# Patient Record
Sex: MALE | Race: BLACK OR AFRICAN AMERICAN | NOT HISPANIC OR LATINO | Employment: FULL TIME | URBAN - NONMETROPOLITAN AREA
[De-identification: names, ages, dates, MRNs, and addresses within clinical notes are randomized per-mention and may not be internally consistent; named-entity substitution may affect disease eponyms.]

---

## 2021-10-07 ENCOUNTER — OFFICE VISIT (OUTPATIENT)
Dept: FAMILY MEDICINE | Facility: CLINIC | Age: 36
End: 2021-10-07

## 2021-10-07 VITALS
RESPIRATION RATE: 20 BRPM | DIASTOLIC BLOOD PRESSURE: 86 MMHG | HEIGHT: 73 IN | WEIGHT: 199.81 LBS | TEMPERATURE: 97 F | OXYGEN SATURATION: 99 % | HEART RATE: 75 BPM | SYSTOLIC BLOOD PRESSURE: 130 MMHG | BODY MASS INDEX: 26.48 KG/M2

## 2021-10-07 DIAGNOSIS — L20.82 FLEXURAL ECZEMA: ICD-10-CM

## 2021-10-07 DIAGNOSIS — M72.2 PLANTAR FASCIITIS OF RIGHT FOOT: ICD-10-CM

## 2021-10-07 DIAGNOSIS — M79.671 FOOT PAIN, RIGHT: Primary | ICD-10-CM

## 2021-10-07 PROCEDURE — 99214 PR OFFICE/OUTPT VISIT, EST, LEVL IV, 30-39 MIN: ICD-10-PCS | Mod: ,,, | Performed by: FAMILY MEDICINE

## 2021-10-07 PROCEDURE — 99214 OFFICE O/P EST MOD 30 MIN: CPT | Mod: ,,, | Performed by: FAMILY MEDICINE

## 2021-10-07 RX ORDER — MOMETASONE FUROATE 1 MG/G
CREAM TOPICAL 2 TIMES DAILY
Qty: 120 G | Refills: 5 | Status: SHIPPED | OUTPATIENT
Start: 2021-10-07

## 2021-10-07 RX ORDER — DICLOFENAC SODIUM 50 MG/1
50 TABLET, DELAYED RELEASE ORAL 2 TIMES DAILY
Qty: 30 TABLET | Refills: 2 | Status: SHIPPED | OUTPATIENT
Start: 2021-10-07

## 2022-02-04 ENCOUNTER — CLINICAL SUPPORT (OUTPATIENT)
Dept: PRIMARY CARE CLINIC | Facility: CLINIC | Age: 37
End: 2022-02-04

## 2022-02-04 DIAGNOSIS — Z02.4 ENCOUNTER FOR DEPARTMENT OF TRANSPORTATION (DOT) EXAMINATION FOR DRIVING LICENSE RENEWAL: ICD-10-CM

## 2022-02-04 PROCEDURE — 99499 UNLISTED E&M SERVICE: CPT | Mod: ,,, | Performed by: NURSE PRACTITIONER

## 2022-02-04 PROCEDURE — 99499 PR PHYSICAL - DOT/CDL: ICD-10-PCS | Mod: ,,, | Performed by: NURSE PRACTITIONER

## 2022-02-07 NOTE — PROGRESS NOTES
Subjective:       Patient ID: Deann Blanca is a 36 y.o. male.    Chief Complaint: No chief complaint on file.    HPI  Review of Systems      Objective:      Physical Exam    Assessment:       Problem List Items Addressed This Visit    None     Visit Diagnoses     Encounter for Department of Transportation (DOT) examination for driving license renewal              Plan:       See scanned documents in .

## 2023-01-06 ENCOUNTER — OFFICE VISIT (OUTPATIENT)
Dept: FAMILY MEDICINE | Facility: CLINIC | Age: 38
End: 2023-01-06

## 2023-01-06 ENCOUNTER — HOSPITAL ENCOUNTER (OUTPATIENT)
Dept: RADIOLOGY | Facility: HOSPITAL | Age: 38
Discharge: HOME | End: 2023-01-06
Attending: FAMILY MEDICINE

## 2023-01-06 VITALS
RESPIRATION RATE: 16 BRPM | WEIGHT: 203 LBS | OXYGEN SATURATION: 99 % | HEART RATE: 66 BPM | SYSTOLIC BLOOD PRESSURE: 162 MMHG | TEMPERATURE: 98 F | DIASTOLIC BLOOD PRESSURE: 90 MMHG | BODY MASS INDEX: 26.9 KG/M2 | HEIGHT: 73 IN

## 2023-01-06 DIAGNOSIS — M79.672 LEFT FOOT PAIN: Primary | ICD-10-CM

## 2023-01-06 DIAGNOSIS — M79.672 LEFT FOOT PAIN: ICD-10-CM

## 2023-01-06 DIAGNOSIS — S93.602A SPRAIN OF LEFT FOOT, INITIAL ENCOUNTER: ICD-10-CM

## 2023-01-06 PROBLEM — S93.505A: Status: ACTIVE | Noted: 2023-01-06

## 2023-01-06 PROCEDURE — 73630 X-RAY EXAM OF FOOT: CPT | Mod: TC,LT

## 2023-01-06 PROCEDURE — 99214 PR OFFICE/OUTPT VISIT, EST, LEVL IV, 30-39 MIN: ICD-10-PCS | Mod: ,,, | Performed by: FAMILY MEDICINE

## 2023-01-06 PROCEDURE — 99214 OFFICE O/P EST MOD 30 MIN: CPT | Mod: ,,, | Performed by: FAMILY MEDICINE

## 2023-01-06 RX ORDER — DICLOFENAC SODIUM 50 MG/1
50 TABLET, DELAYED RELEASE ORAL 2 TIMES DAILY
Qty: 30 TABLET | Refills: 1 | Status: SHIPPED | OUTPATIENT
Start: 2023-01-06

## 2023-01-06 NOTE — ASSESSMENT & PLAN NOTE
Sprain of his foot with the pain at the calcaneus on the plantar aspect of the foot.  Will put him on a walking boot to help with immobilizing midfoot.  Will put him on diclofenac 50 mg twice daily.  Follow-up with us in 2 weeks.  He is not improve will consider a CT scan of his midfoot and an orthopedic consultation.

## 2023-01-06 NOTE — PROGRESS NOTES
01/06/23 0848   Depression Patient Health Questionnaire (PHQ-2)   Over the last two weeks how often have you been bothered by little interest or pleasure in doing things 0   Over the last two weeks how often have you been bothered by feeling down, depressed or hopeless 0   PHQ-2 Total Score 0   Depression Patient Health Questionnaire (PHQ-9)   Over the last two weeks how often have you been bothered by trouble falling or staying asleep, or sleeping too much 0   Over the last two weeks how often have you been bothered by feeling tired or having little energy 1   Over the last two weeks how often have you been bothered by a poor appetite or overeating 0   Over the last two weeks how often have you been bothered by feeling bad about yourself - or that you are a failure or have let yourself or your family down 0   Over the last two weeks how often have you been bothered by trouble concentrating on things, such as reading the newspaper or watching television 0   Over the last two weeks how often have you been bothered by moving or speaking so slowly that other people could have noticed. Or the opposite - being so fidgety or restless that you have been moving around a lot more than usual. 0   Over the last two weeks how often have you been bothered by thoughts that you would be better off dead, or of hurting yourself 0   If you checked off any problems, how difficult have these problems made it for you to do your work, take care of things at home or get along with other people? Not difficult at all   PHQ-9 Score 1   PHQ-9 Interpretation Minimal or None

## 2023-01-06 NOTE — PROGRESS NOTES
Is   Bo Newby DO   [address] [place]  [address]      PATIENT NAME: Deann Blanca  : 1985  DATE: 23  MRN: [id number]      Billing Provider: Bo Newby DO  Level of Service:   Patient PCP Information       Provider PCP Type    Bo Newby DO General            Reason for Visit / Chief Complaint: Heel Pain (Left heel. Was playing basketball a couple months ago and came down on his heel and has been hurting on and off since then. Sometimes he has throbbing pain and sometimes he can't put a lot of pressure on it. ) and Toe Pain (Reports left great toe has pain in it sometimes when he is walking. Reports it started about a month ago. )       Update PCP  Update Chief Complaint         History of Present Illness / Problem Focused Workflow     Deann Blanca presents to the clinic with Heel Pain (Left heel. Was playing basketball a couple months ago and came down on his heel and has been hurting on and off since then. Sometimes he has throbbing pain and sometimes he can't put a lot of pressure on it. ) and Toe Pain (Reports left great toe has pain in it sometimes when he is walking. Reports it started about a month ago. )     Patient reports left heel pain after he jumped on a proximally a month ago.  He states he is had pain off and on in the heel since then.  Also has some pain in his mid foot.  He denies any lower leg pain.  No prior history of injury to that area.  No numbness or tingling has been noted.    Toe Pain   Pertinent negatives include no numbness.     Review of Systems     Review of Systems   Constitutional:  Negative for activity change, appetite change, chills, fatigue and fever.   HENT:  Negative for nasal congestion, ear discharge, ear pain, mouth dryness, mouth sores, postnasal drip, sinus pressure/congestion, sore throat and voice change.    Eyes:  Negative for pain, discharge, redness, itching and visual disturbance.   Respiratory:   Negative for apnea, cough, chest tightness, shortness of breath and wheezing.    Cardiovascular:  Negative for chest pain, palpitations and leg swelling.   Gastrointestinal:  Negative for abdominal distention, abdominal pain, anal bleeding, blood in stool, change in bowel habit, constipation, diarrhea, nausea, vomiting, reflux and change in bowel habit.   Endocrine: Negative for cold intolerance, heat intolerance, polydipsia, polyphagia and polyuria.   Genitourinary:  Negative for difficulty urinating, enuresis, erectile dysfunction, frequency, genital sores, hematuria and urgency.   Musculoskeletal:  Positive for joint swelling, leg pain and joint deformity. Negative for arthralgias, back pain, gait problem, myalgias and neck pain.   Integumentary:  Negative for rash, mole/lesion, breast mass and breast discharge.   Allergic/Immunologic: Negative for environmental allergies and food allergies.   Neurological:  Negative for dizziness, vertigo, tremors, seizures, syncope, facial asymmetry, speech difficulty, weakness, light-headedness, numbness, headaches, coordination difficulties, memory loss and coordination difficulties.   Hematological:  Negative for adenopathy. Does not bruise/bleed easily.   Psychiatric/Behavioral:  Negative for agitation, behavioral problems, confusion, decreased concentration, dysphoric mood, hallucinations, self-injury, sleep disturbance and suicidal ideas. The patient is not nervous/anxious and is not hyperactive.    Breast: Negative for mass    Medical / Social / Family History     Past Medical History:   Diagnosis Date    Allergic contact dermatitis due to other agents 11/14/2019       History reviewed. No pertinent surgical history.    Social History    reports that he has never smoked. He has been exposed to tobacco smoke. He has never used smokeless tobacco. He reports current alcohol use. He reports that he does not use drugs.    Family History  's family history includes  Diabetes in his brother; Heart attacks under age 50 in his brother; Hypertension in his paternal grandmother; No Known Problems in his daughter, father, maternal grandfather, maternal grandmother, mother, sister, and son.    Medications and Allergies     Medications  No outpatient medications have been marked as taking for the 1/6/23 encounter (Office Visit) with Bo Newby DO.       Allergies  Review of patient's allergies indicates:  No Known Allergies    Physical Examination     Vitals:    01/06/23 0837   BP: (!) 162/90   Pulse: 66   Resp: 16   Temp: 97.7 °F (36.5 °C)     Physical Exam  Constitutional:       Appearance: Normal appearance. He is normal weight.   Musculoskeletal:         General: Tenderness and signs of injury present. No swelling or deformity. Normal range of motion.      Right lower leg: No edema.      Left lower leg: No edema.      Comments: Midfoot mildly tender but no deformity is noted.  Range of motion of the toes the ankle or full.  There is some mild tenderness in the calcaneus on the plantar aspect of the foot.  The talus and the ankle mortise are intact and nontender.  Range of motion is full.  Neurovascular is intact.   Skin:     General: Skin is warm and dry.   Neurological:      General: No focal deficit present.      Mental Status: He is alert and oriented to person, place, and time. Mental status is at baseline.   Psychiatric:         Mood and Affect: Mood normal.         Behavior: Behavior normal.             No results found for: WBC, HGB, HCT, MCV, PLT       No results found for: NA, K, CL, CO2, GLU, BUN, CREATININE, CALCIUM, PROT, ALBUMIN, BILITOT, ALKPHOS, AST, ALT, ANIONGAP, ESTGFRAFRICA, EGFRNONAA   X-Ray Foot Complete 3 view Left  Narrative: EXAMINATION:  XR FOOT COMPLETE 3 VIEW LEFT    CLINICAL HISTORY:  Pain in left foot    TECHNIQUE:  XR FOOT COMPLETE 3 VIEW LEFT    COMPARISON:  None    FINDINGS:  No acute fracture or dislocation.    No joint abnormality.    No  radiopaque foreign bodies.  Impression: No acute findings    Electronically signed by: Dakota Hoffmann  Date:    01/06/2023  Time:    10:27     Procedures   Assessment and Plan (including Health Maintenance)      Problem List  Smart Sets  Document Outside HM   :    Plan:         Health Maintenance Due   Topic Date Due    Lipid Panel  Never done    COVID-19 Vaccine (1) Never done    TETANUS VACCINE  12/04/2020    Influenza Vaccine (1) 09/01/2022       Problem List Items Addressed This Visit          Orthopedic    Left foot pain - Primary    Relevant Medications    diclofenac (VOLTAREN) 50 MG EC tablet    Other Relevant Orders    X-Ray Foot Complete 3 view Left (Completed)    AIR CAST WALKER BOOT FOR HOME USE    Sprain of left foot    Current Assessment & Plan     Sprain of his foot with the pain at the calcaneus on the plantar aspect of the foot.  Will put him on a walking boot to help with immobilizing midfoot.  Will put him on diclofenac 50 mg twice daily.  Follow-up with us in 2 weeks.  He is not improve will consider a CT scan of his midfoot and an orthopedic consultation.         Relevant Medications    diclofenac (VOLTAREN) 50 MG EC tablet       The patient has no Health Maintenance topics of status Not Due    Future Appointments   Date Time Provider Department Center   1/18/2023  1:15 PM Bo Newby DO [facility]        Follow up in about 2 weeks (around 1/20/2023), or if symptoms worsen or fail to improve.     Signature:  DO [facility]  [address] [place] [address], [place]  [address]    Date of encounter: 1/6/23

## 2024-04-26 ENCOUNTER — HOSPITAL ENCOUNTER (EMERGENCY)
Facility: HOSPITAL | Age: 39
Discharge: HOME | End: 2024-04-26

## 2024-04-26 VITALS
SYSTOLIC BLOOD PRESSURE: 151 MMHG | HEART RATE: 84 BPM | TEMPERATURE: 98 F | BODY MASS INDEX: 27.43 KG/M2 | DIASTOLIC BLOOD PRESSURE: 86 MMHG | HEIGHT: 73 IN | RESPIRATION RATE: 18 BRPM | OXYGEN SATURATION: 98 % | WEIGHT: 207 LBS

## 2024-04-26 DIAGNOSIS — I10 HYPERTENSION, UNSPECIFIED TYPE: Primary | ICD-10-CM

## 2024-04-26 LAB
ANION GAP SERPL CALCULATED.3IONS-SCNC: 11 MMOL/L (ref 7–16)
BUN SERPL-MCNC: 12 MG/DL (ref 7–18)
BUN/CREAT SERPL: 10 (ref 6–20)
CALCIUM SERPL-MCNC: 8.8 MG/DL (ref 8.5–10.1)
CHLORIDE SERPL-SCNC: 103 MMOL/L (ref 98–107)
CO2 SERPL-SCNC: 27 MMOL/L (ref 21–32)
CREAT SERPL-MCNC: 1.19 MG/DL (ref 0.7–1.3)
EGFR (NO RACE VARIABLE) (RUSH/TITUS): 81 ML/MIN/1.73M2
GLUCOSE SERPL-MCNC: 109 MG/DL (ref 74–106)
POTASSIUM SERPL-SCNC: 3.4 MMOL/L (ref 3.5–5.1)
SODIUM SERPL-SCNC: 138 MMOL/L (ref 136–145)

## 2024-04-26 PROCEDURE — 36415 COLL VENOUS BLD VENIPUNCTURE: CPT | Performed by: REGISTERED NURSE

## 2024-04-26 PROCEDURE — 99283 EMERGENCY DEPT VISIT LOW MDM: CPT | Mod: ,,, | Performed by: REGISTERED NURSE

## 2024-04-26 PROCEDURE — 99283 EMERGENCY DEPT VISIT LOW MDM: CPT

## 2024-04-26 PROCEDURE — 80048 BASIC METABOLIC PNL TOTAL CA: CPT | Performed by: REGISTERED NURSE

## 2024-04-26 NOTE — Clinical Note
"Deann"Apolinar Blanca was seen and treated in our emergency department on 4/26/2024.  He may return to work on 04/29/2024.       If you have any questions or concerns, please don't hesitate to call.      Jose Huitron, NP-C"

## 2024-04-26 NOTE — ED PROVIDER NOTES
Encounter Date: 2024       History     Chief Complaint   Patient presents with    Hypertension     38 y-old AAM received to ED with complaint of HTN. He states that his BP was 180/110 at that time. BP is 156/81 in the ED. No complaints.       Review of patient's allergies indicates:  No Known Allergies  Past Medical History:   Diagnosis Date    Allergic contact dermatitis due to other agents 2019     No past surgical history on file.  Family History   Problem Relation Name Age of Onset    No Known Problems Mother      No Known Problems Father      No Known Problems Sister      Diabetes Brother      Heart attacks under age 50 Brother           at age 37    No Known Problems Daughter          2 daughters    No Known Problems Son          2 sons    No Known Problems Maternal Grandmother      No Known Problems Maternal Grandfather      Hypertension Paternal Grandmother       Social History     Tobacco Use    Smoking status: Never     Passive exposure: Current    Smokeless tobacco: Never   Substance Use Topics    Alcohol use: Yes     Comment: occasional    Drug use: Never     Review of Systems   Constitutional: Negative.    HENT: Negative.     Eyes: Negative.    Respiratory: Negative.     Cardiovascular: Negative.    Gastrointestinal: Negative.    Endocrine: Negative.    Genitourinary: Negative.    Musculoskeletal: Negative.    Skin: Negative.    Allergic/Immunologic: Negative.    Neurological: Negative.    Hematological: Negative.    Psychiatric/Behavioral: Negative.     All other systems reviewed and are negative.      Physical Exam     Initial Vitals [24 0953]   BP Pulse Resp Temp SpO2   (!) 157/92 70 16 98 °F (36.7 °C) 98 %      MAP       --         Physical Exam    Nursing note and vitals reviewed.  Constitutional: He appears well-developed and well-nourished.   HENT:   Head: Normocephalic.   Right Ear: External ear normal.   Left Ear: External ear normal.   Nose: Nose normal.   Mouth/Throat:  Oropharynx is clear and moist.   Eyes: Conjunctivae are normal.   Neck: Neck supple.   Normal range of motion.  Cardiovascular:  Normal rate, regular rhythm, normal heart sounds and intact distal pulses.           Pulmonary/Chest: Breath sounds normal.   Abdominal: Abdomen is soft. Bowel sounds are normal.   Musculoskeletal:         General: Normal range of motion.      Cervical back: Normal range of motion and neck supple.     Neurological: He is alert and oriented to person, place, and time. He has normal strength. GCS score is 15. GCS eye subscore is 4. GCS verbal subscore is 5. GCS motor subscore is 6.   Skin: Skin is warm and dry. Capillary refill takes less than 2 seconds.   Psychiatric: He has a normal mood and affect. His behavior is normal. Judgment and thought content normal.         Medical Screening Exam   See Full Note    ED Course   Procedures  Labs Reviewed   BASIC METABOLIC PANEL - Abnormal; Notable for the following components:       Result Value    Potassium 3.4 (*)     Glucose 109 (*)     All other components within normal limits          Imaging Results    None          Medications - No data to display  Medical Decision Making  38 y-old AAM received to ED with complaint of HTN. He states that his BP was 180/110 at that time. BP is 156/81 in the ED. No complaints.       Risk  Risk Details: Discussed at length the need for him to f/u with his PCP for BP treatment.                                      Clinical Impression:   Final diagnoses:  [I10] Hypertension, unspecified type (Primary)        ED Disposition Condition    Discharge Stable          ED Prescriptions    None       Follow-up Information       Follow up With Specialties Details Why Contact Info    Your Regular Doctor  Schedule an appointment as soon as possible for a visit in 2 days               Jose Huitron NP-C  04/26/24 6546

## 2024-04-26 NOTE — ED TRIAGE NOTES
Was being seen at [facility] and was sent to the ED with high blood pressure. B/p recorded at 188/110.  Patient states they only checked it once.  Denies headache, dizziness, blurred vision.

## 2024-04-26 NOTE — DISCHARGE INSTRUCTIONS
Take your blood pressure at different times throughout the day and keep a record to take to your regular doctor.

## 2024-04-29 ENCOUNTER — TELEPHONE (OUTPATIENT)
Dept: EMERGENCY MEDICINE | Facility: HOSPITAL | Age: 39
End: 2024-04-29

## 2025-04-07 ENCOUNTER — OFFICE VISIT (OUTPATIENT)
Dept: FAMILY MEDICINE | Facility: CLINIC | Age: 40
End: 2025-04-07

## 2025-04-07 ENCOUNTER — RESULTS FOLLOW-UP (OUTPATIENT)
Dept: FAMILY MEDICINE | Facility: CLINIC | Age: 40
End: 2025-04-07

## 2025-04-07 VITALS
SYSTOLIC BLOOD PRESSURE: 122 MMHG | OXYGEN SATURATION: 98 % | BODY MASS INDEX: 28.71 KG/M2 | TEMPERATURE: 97 F | DIASTOLIC BLOOD PRESSURE: 76 MMHG | WEIGHT: 216.63 LBS | HEIGHT: 73 IN | HEART RATE: 82 BPM | RESPIRATION RATE: 18 BRPM

## 2025-04-07 DIAGNOSIS — M72.2 PLANTAR FASCIITIS OF RIGHT FOOT: ICD-10-CM

## 2025-04-07 DIAGNOSIS — M54.2 NECK PAIN: ICD-10-CM

## 2025-04-07 DIAGNOSIS — M54.50 LUMBAR PAIN: ICD-10-CM

## 2025-04-07 DIAGNOSIS — M65.4 DE QUERVAIN'S TENOSYNOVITIS: Primary | ICD-10-CM

## 2025-04-07 DIAGNOSIS — M25.532 LEFT WRIST PAIN: ICD-10-CM

## 2025-04-07 PROCEDURE — 99214 OFFICE O/P EST MOD 30 MIN: CPT | Mod: 25,,, | Performed by: NURSE PRACTITIONER

## 2025-04-07 PROCEDURE — 96372 THER/PROPH/DIAG INJ SC/IM: CPT | Mod: ,,, | Performed by: NURSE PRACTITIONER

## 2025-04-07 RX ORDER — DICLOFENAC SODIUM 50 MG/1
50 TABLET, DELAYED RELEASE ORAL 2 TIMES DAILY
Qty: 30 TABLET | Refills: 2 | Status: SHIPPED | OUTPATIENT
Start: 2025-04-07

## 2025-04-07 RX ORDER — METHYLPREDNISOLONE 4 MG/1
TABLET ORAL
Qty: 21 EACH | Refills: 0 | Status: SHIPPED | OUTPATIENT
Start: 2025-04-07 | End: 2025-04-28

## 2025-04-07 RX ORDER — IBUPROFEN 600 MG/1
600 TABLET ORAL
COMMUNITY
Start: 2025-03-22 | End: 2025-04-07

## 2025-04-07 RX ORDER — DEXAMETHASONE SODIUM PHOSPHATE 4 MG/ML
4 INJECTION, SOLUTION INTRA-ARTICULAR; INTRALESIONAL; INTRAMUSCULAR; INTRAVENOUS; SOFT TISSUE
Status: COMPLETED | OUTPATIENT
Start: 2025-04-07 | End: 2025-04-07

## 2025-04-07 RX ORDER — METHOCARBAMOL 500 MG/1
500 TABLET, FILM COATED ORAL
COMMUNITY
Start: 2025-03-22 | End: 2025-04-07

## 2025-04-07 RX ADMIN — DEXAMETHASONE SODIUM PHOSPHATE 4 MG: 4 INJECTION, SOLUTION INTRA-ARTICULAR; INTRALESIONAL; INTRAMUSCULAR; INTRAVENOUS; SOFT TISSUE at 11:04

## 2025-04-07 NOTE — PROGRESS NOTES
DICK Lee   [facility] [address] [place] [address]  [phone]      PATIENT NAME: Deann Blanca  : 1985  DATE: 25  MRN: [id number]      Billing Provider: DICK Lee  Level of Service:   Patient PCP Information       Provider PCP Type    Bo Newby DO General            History of Present Illness    CHIEF COMPLAINT:  Patient presents today for pain in multiple areas including wrist, neck, and back.    MUSCULOSKELETAL PAIN:  He reports left wrist pain that began consistently a couple months ago, exacerbated by pressure application and associated with stiffness. He experiences bilateral neck pain that initially started at [facility] last year after lifting a case at eye level, causing a sharp radiating pain throughout the body that made him freeze at the time of injury. He also reports intermittent lower back pain for approximately one year.    MEDICATIONS:  He was previously prescribed diclofenac (Voltaren) by Dr. Newby for arthritis pain, but is uncertain about the medication's effectiveness or when it was last taken.        ROS:  General: -fever, -chills, -fatigue, -weight gain, -weight loss  Eyes: -vision changes, -redness, -discharge  ENT: -ear pain, -nasal congestion, -sore throat  Cardiovascular: -chest pain, -palpitations, -lower extremity edema  Respiratory: -cough, -shortness of breath, +wheezing  Gastrointestinal: -abdominal pain, -nausea, -vomiting, -diarrhea, -constipation, -blood in stool  Genitourinary: -dysuria, -hematuria, -frequency  Musculoskeletal: +joint pain, -muscle pain, +back pain, +limb pain, +neck pain, +joint stiffness  Skin: -rash, -lesion  Neurological: -headache, -dizziness, -numbness, -tingling  Psychiatric: -anxiety, -depression, -sleep difficulty          Medications and Allergies     Medications  No outpatient medications have been marked as taking for the 25 encounter  (Office Visit) with Christina Christian FNP.     Current Facility-Administered Medications for the 4/7/25 encounter (Office Visit) with Christina Christian FNP   Medication Dose Route Frequency Provider Last Rate Last Admin    [COMPLETED] dexAMETHasone injection 4 mg  4 mg Intramuscular 1 time in Clinic/HOD Anahi DICK Montaño   4 mg at 04/07/25 1116       Allergies  Review of patient's allergies indicates:  No Known Allergies    Physical Exam    General: No acute distress. Well-developed.   Eyes: EOMI.   HENT: Normocephalic. Atraumatic.   Cardiovascular: Regular rate. Regular rhythm.   Respiratory: Normal respiratory effort. Clear to auscultation bilaterally.   Abdomen: Soft. Non-tender. Non-distended. Normoactive bowel sounds.  Musculoskeletal: No  obvious deformity. Positive Finkelstein test to the left wrist.  Extremities: No lower extremity edema.  Neurological: Alert & oriented x3. No slurred speech. Normal gait.  Psychiatric: Normal mood. Normal affect.  Skin: Warm. Dry. No rash.          Assessment & Plan      IMPRESSION:  - Assessed wrist pain as De Quervain's tenosynovitis based on positive Finkelstein test and exam.  - Initiated treatment for wrist, neck, and back pain with combination of steroids and NSAIDs.  - Plan to reassess treatment approach after radiology report.    DE QUERVAIN'S TENOSYNOVITIS (LEFT WRIST):  - Diagnosed the patient with De Quervain's tenosynovitis, an inflammation of the tendon in the left wrist.  - Noted that the patient has had consistent pain in the left wrist for a few months.  - Performed physical exam, revealing pain on pressure and during specific movements of the left wrist.  - Conducted Finkelstein test, which was positive.   - Administered Dexamethasone IM injection in the office.  - Prescribed Medrol Dosepak to begin the following day.  - Refilled Voltaren PO for pain management.  - Ordered a wrist splint for the left wrist and instructed the patient to wear it for  approximately 2 weeks.    CERVICALGIA (NECK PAIN):  - Noted that the patient reports neck pain that started after lifting a case at eye level, with sharp pain on both sides of the neck radiating down to his back.  - Performed X-ray of the neck.   - Assessed that the pain may be originating from nerve pain, likely due to inflammation.  - Administered Dexamethasone IM injection.    LOW BACK PAIN:  - Noted that the patient reports lower back pain that has been present intermittently for about a year.  - Performed X-ray of the lower back.   - Administered Dexamethasone IM injection.  - Recommend rest if possible.      GENERAL INSTRUCTIONS AND FOLLOW-UP:  - Advised the patient to follow up as needed for potential referral to orthopedics, depending on radiology results.  - Informed about potential side effects of steroids and NSAIDS.     Follow up if symptoms fail to improve or worsen.          Health Maintenance Due   Topic Date Due    Hepatitis C Screening  Never done    Lipid Panel  Never done    Hemoglobin A1c (Diabetic Prevention Screening)  Never done    Influenza Vaccine (1) 09/01/2024    COVID-19 Vaccine (2 - 2024-25 season) 09/01/2024       Problem List Items Addressed This Visit          Orthopedic    Plantar fasciitis of right foot     Other Visit Diagnoses         De Quervain's tenosynovitis    -  Primary    Relevant Medications    dexAMETHasone injection 4 mg (Completed)    methylPREDNISolone (MEDROL DOSEPACK) 4 mg tablet    diclofenac (VOLTAREN) 50 MG EC tablet      Left wrist pain        Relevant Medications    dexAMETHasone injection 4 mg (Completed)    Other Relevant Orders    X-Ray Wrist Complete 3 views Left (Completed)      Neck pain        Relevant Medications    dexAMETHasone injection 4 mg (Completed)    methylPREDNISolone (MEDROL DOSEPACK) 4 mg tablet    Other Relevant Orders    X-Ray Cervical Spine 2 or 3 Views (Completed)      Lumbar pain        Relevant Medications    methylPREDNISolone (MEDROL  DOSEPACK) 4 mg tablet    Other Relevant Orders    X-Ray Lumbar Spine AP And Lateral (Completed)            Health Maintenance Topics with due status: Not Due       Topic Last Completion Date    TETANUS VACCINE 07/20/2021    RSV Vaccine (Age 60+ and Pregnant patients) Not Due       No future appointments.     This note was generated with the assistance of ambient listening technology. Verbal consent was obtained by the patient and accompanying visitor(s) for the recording of patient appointment to facilitate this note. I attest to having reviewed and edited the generated note for accuracy, though some syntax or spelling errors may persist. Please contact the author of this note for any clarification.     Signature:  DICK Lee  [facility] [address] [place] [address]  [phone]    Date of encounter: 4/7/25

## 2025-04-28 DIAGNOSIS — M25.532 LEFT WRIST PAIN: Primary | ICD-10-CM

## 2025-05-02 ENCOUNTER — OFFICE VISIT (OUTPATIENT)
Dept: ORTHOPEDICS | Facility: CLINIC | Age: 40
End: 2025-05-02

## 2025-05-02 VITALS
OXYGEN SATURATION: 98 % | WEIGHT: 217 LBS | HEART RATE: 70 BPM | BODY MASS INDEX: 28.76 KG/M2 | SYSTOLIC BLOOD PRESSURE: 147 MMHG | HEIGHT: 73 IN | DIASTOLIC BLOOD PRESSURE: 83 MMHG

## 2025-05-02 DIAGNOSIS — M25.532 LEFT WRIST PAIN: ICD-10-CM

## 2025-05-02 DIAGNOSIS — M65.4 DE QUERVAIN'S TENOSYNOVITIS, LEFT: Primary | ICD-10-CM

## 2025-05-02 PROCEDURE — 99999 PR PBB SHADOW E&M-EST. PATIENT-LVL III: CPT | Mod: PBBFAC,,, | Performed by: NURSE PRACTITIONER

## 2025-05-02 PROCEDURE — 20550 NJX 1 TENDON SHEATH/LIGAMENT: CPT | Mod: PBBFAC,LT | Performed by: NURSE PRACTITIONER

## 2025-05-02 PROCEDURE — 99213 OFFICE O/P EST LOW 20 MIN: CPT | Mod: PBBFAC | Performed by: NURSE PRACTITIONER

## 2025-05-02 PROCEDURE — 99999PBSHW PR PBB SHADOW TECHNICAL ONLY FILED TO HB: Mod: PBBFAC,,,

## 2025-05-02 RX ORDER — TRIAMCINOLONE ACETONIDE 40 MG/ML
20 INJECTION, SUSPENSION INTRA-ARTICULAR; INTRAMUSCULAR
Status: DISCONTINUED | OUTPATIENT
Start: 2025-05-02 | End: 2025-05-02 | Stop reason: HOSPADM

## 2025-05-02 RX ADMIN — TRIAMCINOLONE ACETONIDE 20 MG: 40 INJECTION, SUSPENSION INTRA-ARTICULAR; INTRAMUSCULAR at 09:05

## 2025-05-02 NOTE — PROGRESS NOTES
39 y.o. Male returns to clinic for a follow up visit regarding     ICD-10-CM ICD-9-CM   1. De Quervain's tenosynovitis, left  M65.4 727.04   2. Left wrist pain  M25.532 719.43        Patient is here today with complaint of left wrist pain.  Reports the pain is at the base of his thumb runs to his wrist and up his arm.  Certain motions of the wrist and thumb are very painful.  Reports it has been going on for a couple of months.  He has seen his primary care provider, has taken an anti-inflammatories Medrol Dosepak.  He has had no injury that he is aware of.  Very tender at the base of his thumb and over the radial side of his wrist.       Past Medical History:   Diagnosis Date    Allergic contact dermatitis due to other agents 11/14/2019     History reviewed. No pertinent surgical history.      PHYSICAL EXAMINATION:            Left Hand/Wrist Exam     Inspection   Effusion: Wrist - absent   Bruising: Wrist - absent     Tenderness   The patient is tender to palpation of the radial area.     Range of Motion     Wrist   Extension:  normal   Flexion:  normal   Pronation:  normal   Supination:  normal     Other     Sensory Exam  Median Distribution: normal          Muscle Strength   Left Upper Extremity  Wrist extension: 5/5   Wrist flexion: 5/5   :  5/5     Vascular Exam       Capillary Refill  Left Hand: normal capillary refill        IMAGING:  X-Ray Lumbar Spine AP And Lateral  Result Date: 4/7/2025  EXAMINATION: XR LUMBAR SPINE AP AND LATERAL CLINICAL HISTORY: Low back pain, unspecified TECHNIQUE: AP, lateral and spot images were performed of the lumbar spine. COMPARISON: None FINDINGS: No fracture.  There is mild retrolisthesis of L5 on S1.  Mild loss of disc height is present the L5-S1 level with disc heights otherwise well maintained.     Mild L5-S1 retrolisthesis and degenerative disc change. Electronically signed by: Franklin Lizarraga MD Date:    04/07/2025 Time:    11:38    X-Ray Cervical Spine 2 or 3  Views  Result Date: 4/7/2025  EXAMINATION: XR CERVICAL SPINE 2 OR 3 VIEWS CLINICAL HISTORY: Cervicalgia TECHNIQUE: AP, lateral and open mouth views of the cervical spine were performed. COMPARISON: None. FINDINGS: Vertebral body height and alignment are anatomic.  Disc heights are well maintained.  There are no significant degenerative changes.  There is no prevertebral soft tissue swelling.     Normal cervical spine. Electronically signed by: Franklin Lizarraga MD Date:    04/07/2025 Time:    11:37    X-Ray Wrist Complete 3 views Left  Result Date: 4/7/2025  EXAMINATION: XR WRIST COMPLETE 3 VIEWS LEFT CLINICAL HISTORY: Pain in left wrist TECHNIQUE: PA, lateral, and oblique views of the left wrist were performed. COMPARISON: None FINDINGS: No fracture or dislocation.  No significant degenerative changes.  The soft tissues are unremarkable.     No significant pathology. Electronically signed by: Franklin Lizarraga MD Date:    04/07/2025 Time:    11:36       ASSESSMENT:      ICD-10-CM ICD-9-CM   1. De Quervain's tenosynovitis, left  M65.4 727.04   2. Left wrist pain  M25.532 719.43       PLAN:     -Findings and treatment options were discussed with the patient  -All questions answered      Left de Quervain injection today.  Wrist brace.  Continue diclofenac twice a day.  Return to clinic 3 weeks    There are no Patient Instructions on file for this visit.      No orders of the defined types were placed in this encounter.        Tendon Sheath    Date/Time: 5/2/2025 9:20 AM    Performed by: Saira Dempsey FNP  Authorized by: Saira Dempsey FNP    Consent Done?:  Yes (Verbal)  Indications:  Pain  Local anesthetic:  Bupivacaine 0.25% without epinephrine  Location:  Wrist  Site:  L first doral compartment  Medications:  20 mg triamcinolone acetonide 40 mg/mL